# Patient Record
Sex: FEMALE | Race: WHITE | HISPANIC OR LATINO | Employment: UNEMPLOYED | URBAN - NONMETROPOLITAN AREA
[De-identification: names, ages, dates, MRNs, and addresses within clinical notes are randomized per-mention and may not be internally consistent; named-entity substitution may affect disease eponyms.]

---

## 2023-02-11 ENCOUNTER — HOSPITAL ENCOUNTER (EMERGENCY)
Facility: HOSPITAL | Age: 33
Discharge: HOME | End: 2023-02-12
Attending: EMERGENCY MEDICINE
Payer: MEDICAID

## 2023-02-11 DIAGNOSIS — K75.4 AUTOIMMUNE HEPATITIS: ICD-10-CM

## 2023-02-11 DIAGNOSIS — R42 LIGHTHEADEDNESS: ICD-10-CM

## 2023-02-11 DIAGNOSIS — E86.0 DEHYDRATION: ICD-10-CM

## 2023-02-11 DIAGNOSIS — R00.2 PALPITATIONS: Primary | ICD-10-CM

## 2023-02-11 DIAGNOSIS — R11.0 NAUSEA: ICD-10-CM

## 2023-02-11 DIAGNOSIS — N39.0 URINARY TRACT INFECTION WITHOUT HEMATURIA, SITE UNSPECIFIED: ICD-10-CM

## 2023-02-11 DIAGNOSIS — R42 ORTHOSTATIC DIZZINESS: ICD-10-CM

## 2023-02-11 DIAGNOSIS — K21.9 GASTROESOPHAGEAL REFLUX DISEASE, UNSPECIFIED WHETHER ESOPHAGITIS PRESENT: ICD-10-CM

## 2023-02-11 LAB
ALBUMIN SERPL BCP-MCNC: 4 G/DL (ref 3.5–5.2)
ALP SERPL-CCNC: 84 U/L (ref 55–135)
ALT SERPL W/O P-5'-P-CCNC: 24 U/L (ref 10–44)
ANION GAP SERPL CALC-SCNC: 11 MMOL/L (ref 8–16)
AST SERPL-CCNC: 19 U/L (ref 10–40)
BACTERIA #/AREA URNS HPF: ABNORMAL /HPF
BASOPHILS # BLD AUTO: 0.02 K/UL (ref 0–0.2)
BASOPHILS NFR BLD: 0.2 % (ref 0–1.9)
BILIRUB SERPL-MCNC: 0.2 MG/DL (ref 0.1–1)
BILIRUB UR QL STRIP: NEGATIVE
BUN SERPL-MCNC: 11 MG/DL (ref 6–20)
CALCIUM SERPL-MCNC: 9.3 MG/DL (ref 8.7–10.5)
CHLORIDE SERPL-SCNC: 106 MMOL/L (ref 95–110)
CLARITY UR: ABNORMAL
CO2 SERPL-SCNC: 23 MMOL/L (ref 23–29)
COLOR UR: YELLOW
CREAT SERPL-MCNC: 0.6 MG/DL (ref 0.5–1.4)
DIFFERENTIAL METHOD: ABNORMAL
EOSINOPHIL # BLD AUTO: 0 K/UL (ref 0–0.5)
EOSINOPHIL NFR BLD: 0 % (ref 0–8)
ERYTHROCYTE [DISTWIDTH] IN BLOOD BY AUTOMATED COUNT: 15.5 % (ref 11.5–14.5)
EST. GFR  (NO RACE VARIABLE): >60 ML/MIN/1.73 M^2
GLUCOSE SERPL-MCNC: 101 MG/DL (ref 70–110)
GLUCOSE UR QL STRIP: NEGATIVE
HCT VFR BLD AUTO: 34.5 % (ref 37–48.5)
HGB BLD-MCNC: 11.1 G/DL (ref 12–16)
HGB UR QL STRIP: ABNORMAL
HYALINE CASTS #/AREA URNS LPF: 3.5 /LPF
IMM GRANULOCYTES # BLD AUTO: 0.04 K/UL (ref 0–0.04)
IMM GRANULOCYTES NFR BLD AUTO: 0.4 % (ref 0–0.5)
KETONES UR QL STRIP: ABNORMAL
LEUKOCYTE ESTERASE UR QL STRIP: ABNORMAL
LYMPHOCYTES # BLD AUTO: 1.5 K/UL (ref 1–4.8)
LYMPHOCYTES NFR BLD: 15.7 % (ref 18–48)
MCH RBC QN AUTO: 25.3 PG (ref 27–31)
MCHC RBC AUTO-ENTMCNC: 32.2 G/DL (ref 32–36)
MCV RBC AUTO: 79 FL (ref 82–98)
MICROSCOPIC COMMENT: ABNORMAL
MONOCYTES # BLD AUTO: 0.5 K/UL (ref 0.3–1)
MONOCYTES NFR BLD: 5.4 % (ref 4–15)
NEUTROPHILS # BLD AUTO: 7.6 K/UL (ref 1.8–7.7)
NEUTROPHILS NFR BLD: 78.3 % (ref 38–73)
NITRITE UR QL STRIP: POSITIVE
NRBC BLD-RTO: 0 /100 WBC
PH UR STRIP: 7 [PH] (ref 5–8)
PLATELET # BLD AUTO: 229 K/UL (ref 150–450)
PMV BLD AUTO: 10.1 FL (ref 9.2–12.9)
POTASSIUM SERPL-SCNC: 3.5 MMOL/L (ref 3.5–5.1)
PROT SERPL-MCNC: 8 G/DL (ref 6–8.4)
PROT UR QL STRIP: NEGATIVE
RBC # BLD AUTO: 4.38 M/UL (ref 4–5.4)
RBC #/AREA URNS HPF: 2 /HPF (ref 0–4)
SODIUM SERPL-SCNC: 140 MMOL/L (ref 136–145)
SP GR UR STRIP: 1.01 (ref 1–1.03)
SQUAMOUS #/AREA URNS HPF: 4 /HPF
TROPONIN I SERPL DL<=0.01 NG/ML-MCNC: <4 PG/ML (ref 0–60)
URN SPEC COLLECT METH UR: ABNORMAL
UROBILINOGEN UR STRIP-ACNC: NEGATIVE EU/DL
WBC # BLD AUTO: 9.67 K/UL (ref 3.9–12.7)
WBC #/AREA URNS HPF: 29 /HPF (ref 0–5)

## 2023-02-11 PROCEDURE — 85025 COMPLETE CBC W/AUTO DIFF WBC: CPT | Performed by: EMERGENCY MEDICINE

## 2023-02-11 PROCEDURE — 96375 TX/PRO/DX INJ NEW DRUG ADDON: CPT | Mod: 59

## 2023-02-11 PROCEDURE — 63600175 PHARM REV CODE 636 W HCPCS: Performed by: EMERGENCY MEDICINE

## 2023-02-11 PROCEDURE — 93005 ELECTROCARDIOGRAM TRACING: CPT

## 2023-02-11 PROCEDURE — 96361 HYDRATE IV INFUSION ADD-ON: CPT

## 2023-02-11 PROCEDURE — 87077 CULTURE AEROBIC IDENTIFY: CPT | Performed by: EMERGENCY MEDICINE

## 2023-02-11 PROCEDURE — 36415 COLL VENOUS BLD VENIPUNCTURE: CPT | Performed by: EMERGENCY MEDICINE

## 2023-02-11 PROCEDURE — 85379 FIBRIN DEGRADATION QUANT: CPT | Performed by: EMERGENCY MEDICINE

## 2023-02-11 PROCEDURE — 93010 EKG 12-LEAD: ICD-10-PCS | Mod: ,,, | Performed by: INTERNAL MEDICINE

## 2023-02-11 PROCEDURE — 80053 COMPREHEN METABOLIC PANEL: CPT | Performed by: EMERGENCY MEDICINE

## 2023-02-11 PROCEDURE — 84484 ASSAY OF TROPONIN QUANT: CPT | Performed by: EMERGENCY MEDICINE

## 2023-02-11 PROCEDURE — 87088 URINE BACTERIA CULTURE: CPT | Performed by: EMERGENCY MEDICINE

## 2023-02-11 PROCEDURE — 87186 SC STD MICRODIL/AGAR DIL: CPT | Performed by: EMERGENCY MEDICINE

## 2023-02-11 PROCEDURE — 93010 ELECTROCARDIOGRAM REPORT: CPT | Mod: ,,, | Performed by: INTERNAL MEDICINE

## 2023-02-11 PROCEDURE — 99285 EMERGENCY DEPT VISIT HI MDM: CPT | Mod: 25

## 2023-02-11 PROCEDURE — 81000 URINALYSIS NONAUTO W/SCOPE: CPT | Performed by: EMERGENCY MEDICINE

## 2023-02-11 PROCEDURE — 25000003 PHARM REV CODE 250: Performed by: EMERGENCY MEDICINE

## 2023-02-11 PROCEDURE — 96372 THER/PROPH/DIAG INJ SC/IM: CPT | Mod: 59 | Performed by: EMERGENCY MEDICINE

## 2023-02-11 PROCEDURE — C9113 INJ PANTOPRAZOLE SODIUM, VIA: HCPCS | Performed by: EMERGENCY MEDICINE

## 2023-02-11 PROCEDURE — 87086 URINE CULTURE/COLONY COUNT: CPT | Performed by: EMERGENCY MEDICINE

## 2023-02-11 PROCEDURE — 96365 THER/PROPH/DIAG IV INF INIT: CPT

## 2023-02-11 PROCEDURE — 25500020 PHARM REV CODE 255: Performed by: EMERGENCY MEDICINE

## 2023-02-11 RX ORDER — FAMOTIDINE 20 MG/1
20 TABLET, FILM COATED ORAL 2 TIMES DAILY
Qty: 28 TABLET | Refills: 0 | Status: SHIPPED | OUTPATIENT
Start: 2023-02-11 | End: 2023-06-12 | Stop reason: ALTCHOICE

## 2023-02-11 RX ORDER — ALUMINUM HYDROXIDE, MAGNESIUM HYDROXIDE, AND SIMETHICONE 2400; 240; 2400 MG/30ML; MG/30ML; MG/30ML
15 SUSPENSION ORAL EVERY 6 HOURS PRN
Qty: 100 ML | Refills: 0 | Status: SHIPPED | OUTPATIENT
Start: 2023-02-11 | End: 2024-02-11

## 2023-02-11 RX ORDER — LIDOCAINE HYDROCHLORIDE 20 MG/ML
15 SOLUTION OROPHARYNGEAL ONCE
Status: COMPLETED | OUTPATIENT
Start: 2023-02-11 | End: 2023-02-11

## 2023-02-11 RX ORDER — ONDANSETRON 2 MG/ML
4 INJECTION INTRAMUSCULAR; INTRAVENOUS
Status: COMPLETED | OUTPATIENT
Start: 2023-02-11 | End: 2023-02-11

## 2023-02-11 RX ORDER — SUCRALFATE 1 G/1
1 TABLET ORAL
Qty: 28 TABLET | Refills: 0 | Status: SHIPPED | OUTPATIENT
Start: 2023-02-11 | End: 2023-02-18

## 2023-02-11 RX ORDER — DICYCLOMINE HYDROCHLORIDE 10 MG/ML
20 INJECTION INTRAMUSCULAR
Status: COMPLETED | OUTPATIENT
Start: 2023-02-11 | End: 2023-02-11

## 2023-02-11 RX ORDER — MAG HYDROX/ALUMINUM HYD/SIMETH 200-200-20
30 SUSPENSION, ORAL (FINAL DOSE FORM) ORAL ONCE
Status: COMPLETED | OUTPATIENT
Start: 2023-02-11 | End: 2023-02-11

## 2023-02-11 RX ORDER — PANTOPRAZOLE SODIUM 40 MG/10ML
40 INJECTION, POWDER, LYOPHILIZED, FOR SOLUTION INTRAVENOUS
Status: COMPLETED | OUTPATIENT
Start: 2023-02-11 | End: 2023-02-11

## 2023-02-11 RX ORDER — ONDANSETRON 4 MG/1
4-8 TABLET, ORALLY DISINTEGRATING ORAL EVERY 8 HOURS PRN
Qty: 20 TABLET | Refills: 0 | Status: SHIPPED | OUTPATIENT
Start: 2023-02-11

## 2023-02-11 RX ORDER — PANTOPRAZOLE SODIUM 20 MG/1
20 TABLET, DELAYED RELEASE ORAL
Qty: 28 TABLET | Refills: 0 | Status: SHIPPED | OUTPATIENT
Start: 2023-02-11 | End: 2023-06-12

## 2023-02-11 RX ORDER — HYDROXYZINE PAMOATE 100 MG/1
100 CAPSULE ORAL 4 TIMES DAILY PRN
Qty: 20 CAPSULE | Refills: 0 | Status: SHIPPED | OUTPATIENT
Start: 2023-02-11

## 2023-02-11 RX ADMIN — LIDOCAINE HYDROCHLORIDE 15 ML: 20 SOLUTION ORAL at 09:02

## 2023-02-11 RX ADMIN — PANTOPRAZOLE SODIUM 40 MG: 40 INJECTION, POWDER, FOR SOLUTION INTRAVENOUS at 11:02

## 2023-02-11 RX ADMIN — CEFTRIAXONE SODIUM 1 G: 1 INJECTION, POWDER, FOR SOLUTION INTRAMUSCULAR; INTRAVENOUS at 11:02

## 2023-02-11 RX ADMIN — ONDANSETRON HYDROCHLORIDE 4 MG: 2 SOLUTION INTRAMUSCULAR; INTRAVENOUS at 08:02

## 2023-02-11 RX ADMIN — DICYCLOMINE HYDROCHLORIDE 20 MG: 20 INJECTION INTRAMUSCULAR at 11:02

## 2023-02-11 RX ADMIN — IOHEXOL 100 ML: 350 INJECTION, SOLUTION INTRAVENOUS at 09:02

## 2023-02-11 RX ADMIN — SODIUM CHLORIDE 1000 ML: 9 INJECTION, SOLUTION INTRAVENOUS at 08:02

## 2023-02-11 RX ADMIN — ALUMINUM HYDROXIDE, MAGNESIUM HYDROXIDE, AND SIMETHICONE 30 ML: 200; 200; 20 SUSPENSION ORAL at 09:02

## 2023-02-12 VITALS
BODY MASS INDEX: 26.7 KG/M2 | WEIGHT: 136 LBS | DIASTOLIC BLOOD PRESSURE: 79 MMHG | SYSTOLIC BLOOD PRESSURE: 133 MMHG | HEART RATE: 74 BPM | RESPIRATION RATE: 18 BRPM | OXYGEN SATURATION: 100 % | HEIGHT: 60 IN | TEMPERATURE: 98 F

## 2023-02-12 LAB — D DIMER PPP IA.FEU-MCNC: 0.41 MG/L FEU

## 2023-02-12 RX ORDER — CEPHALEXIN 500 MG/1
500 CAPSULE ORAL 4 TIMES DAILY
Qty: 20 CAPSULE | Refills: 0 | Status: SHIPPED | OUTPATIENT
Start: 2023-02-12 | End: 2023-02-17

## 2023-02-13 NOTE — ED PROVIDER NOTES
Encounter Date: 2/11/2023       History     Chief Complaint   Patient presents with    Dizziness     Pt reports hands numb, dizziness, feeling like she is going to pass out, heart palpitations starting about an hour ago. Reports nausea. Mother gave her a blood pressure pill at home when she started feeling bad, but didn't take a pressure to see what it was.      33-year-old female comes in complaining of lightheadedness bilateral hand paresthesias and feeling like she is going to pass out.  She reports palpitations.  She says that all of the started about an hour ago.  She also reports nausea but no vomiting.  She said that she thought her blood pressure might be high so her mother gave her an unknown blood pressure medication.  No chest pain.  No shortness of breath.  No fever.  No abdominal pain or vomiting or diarrhea. Patient has a normal neurologic exam. No vision changes, confusion, speech difficulty, numbness, weakness. No bowel or bladder incontinence or retention. Ambulates without difficulty.  She also complains of heartburn.        Review of patient's allergies indicates:  No Known Allergies  Past Medical History:   Diagnosis Date    Autoimmune hepatitis     Elevated LFTs     Liver disease      Past Surgical History:   Procedure Laterality Date    LIVER BIOPSY  05/2016    TUBAL LIGATION       Family History   Problem Relation Age of Onset    Diabetes Mother     Hypertension Mother     Thyroid disease Mother     No Known Problems Father      Social History     Tobacco Use    Smoking status: Never    Smokeless tobacco: Never   Substance Use Topics    Alcohol use: Yes     Comment: 6 pk-twice weekly    Drug use: No     Review of Systems   Constitutional:  Negative for fever.   HENT:  Negative for sore throat.    Eyes:  Negative for visual disturbance.   Respiratory:  Negative for shortness of breath.    Cardiovascular:  Positive for palpitations. Negative for chest pain.   Gastrointestinal:  Positive for  nausea. Negative for diarrhea and vomiting.   Genitourinary:  Negative for dysuria.   Musculoskeletal:  Negative for back pain.   Skin:  Negative for rash.   Neurological:  Positive for light-headedness and numbness. Negative for syncope, facial asymmetry, speech difficulty and weakness.   Hematological:  Does not bruise/bleed easily.   Psychiatric/Behavioral:  The patient is nervous/anxious.    All other systems reviewed and are negative.    Physical Exam     Initial Vitals [02/11/23 1924]   BP Pulse Resp Temp SpO2   (!) 143/95 101 18 98.1 °F (36.7 °C) 100 %      MAP       --         Physical Exam    Nursing note and vitals reviewed.  Constitutional: She appears well-developed and well-nourished. She is not diaphoretic. No distress.   HENT:   Head: Normocephalic and atraumatic.   Eyes: EOM are normal. Pupils are equal, round, and reactive to light.   Neck: Neck supple.   Normal range of motion.  Cardiovascular:  Normal rate and regular rhythm.           Pulmonary/Chest: Breath sounds normal. She has no wheezes.   Abdominal: Abdomen is soft. Bowel sounds are normal. There is no abdominal tenderness.   Musculoskeletal:         General: No edema. Normal range of motion.      Cervical back: Normal range of motion and neck supple.     Neurological: She is alert and oriented to person, place, and time. She has normal strength. No cranial nerve deficit or sensory deficit.   Skin: Skin is warm and dry.   Psychiatric: She has a normal mood and affect. Thought content normal.       ED Course   Procedures  Labs Reviewed   CBC W/ AUTO DIFFERENTIAL - Abnormal; Notable for the following components:       Result Value    Hemoglobin 11.1 (*)     Hematocrit 34.5 (*)     MCV 79 (*)     MCH 25.3 (*)     RDW 15.5 (*)     Gran % 78.3 (*)     Lymph % 15.7 (*)     All other components within normal limits   URINALYSIS, REFLEX TO URINE CULTURE - Abnormal; Notable for the following components:    Appearance, UA Cloudy (*)     Ketones, UA  1+ (*)     Occult Blood UA 1+ (*)     Nitrite, UA Positive (*)     Leukocytes, UA Trace (*)     All other components within normal limits    Narrative:     Preferred Collection Type->Urine, Clean Catch  Specimen Source->Urine   URINALYSIS MICROSCOPIC - Abnormal; Notable for the following components:    WBC, UA 29 (*)     Bacteria Moderate (*)     Hyaline Casts, UA 3.5 (*)     All other components within normal limits    Narrative:     Preferred Collection Type->Urine, Clean Catch  Specimen Source->Urine   CULTURE, URINE   COMPREHENSIVE METABOLIC PANEL   D DIMER, QUANTITATIVE   TROPONIN I HIGH SENSITIVITY     EKG Readings: (Independently Interpreted)   Initial Reading: No STEMI. Rhythm: Sinus Arrhythmia.   ECG Results              EKG 12-lead (Final result)  Result time 02/12/23 08:39:50      Final result by Interface, [facility] (02/12/23 08:39:50)                   Narrative:    Test Reason : R00.2,    Vent. Rate : 085 BPM     Atrial Rate : 085 BPM     P-R Int : 144 ms          QRS Dur : 080 ms      QT Int : 372 ms       P-R-T Axes : 069 060 081 degrees     QTc Int : 442 ms    Normal sinus rhythm with sinus arrhythmia  Nonspecific T wave abnormality  Otherwise normal ECG    No previous ECGs available  Confirmed by Neri Falk MD ([id number]) on 2/12/2023 8:39:42 AM    Referred By: [email or web address]   SELF           Confirmed By:Neri Falk MD                                  Imaging Results              CTA Chest Non-Coronary (PE Studies) (Final result)  Result time 02/12/23 10:05:52      Final result by Ann-Marie Nelson MD (02/12/23 10:05:52)                   Impression:      No evidence of pulmonary emboli    Small hiatal hernia    Preliminary report provided by direct Radiology      Electronically signed by: Ann-Marie Nelson MD  Date:    02/12/2023  Time:    10:05               Narrative:    EXAMINATION:  CTA CHEST NON CORONARY (PE STUDIES)    CLINICAL HISTORY:  Pulmonary embolism (PE) suspected, positive  D-dimer;    CT/Cardiac Nuclear exams in prior 12 months: 0    TECHNIQUE:  Pulmonary CT angiogram with IV contrast.  Coronal MIP reconstructions prepared.  Iterative reconstruction utilized.    FINDINGS:  No evidence of pulmonary emboli, aortic aneurysm or dissection.  Clear lungs.  No pneumothorax, pleural effusion or pericardial effusion.  Small hiatal hernia.                                    X-Rays:   Independently Interpreted Readings:   Chest X-Ray: No infiltrates.  No acute abnormalities.   Medications   sodium chloride 0.9% bolus 1,000 mL 1,000 mL (0 mLs Intravenous Stopped 2/11/23 2100)   ondansetron injection 4 mg (4 mg Intravenous Given 2/11/23 2012)   aluminum-magnesium hydroxide-simethicone 200-200-20 mg/5 mL suspension 30 mL (30 mLs Oral Given 2/11/23 2112)     And   LIDOcaine HCl 2% oral solution 15 mL (15 mLs Oral Given 2/11/23 2111)   cefTRIAXone (ROCEPHIN) 1 g in dextrose 5 % in water (D5W) 5 % 50 mL IVPB (MB+) (0 g Intravenous Stopped 2/11/23 2343)   iohexoL (OMNIPAQUE 350) injection 100 mL (100 mLs Intravenous Given 2/11/23 2132)   pantoprazole injection 40 mg (40 mg Intravenous Given 2/11/23 2354)   dicyclomine injection 20 mg (20 mg Intramuscular Given 2/11/23 2359)                Attending Attestation:             Attending ED Notes:   33-year-old female comes in complaining of lightheadedness bilateral hand paresthesias and feeling like she is going to pass out.  She reports palpitations.  She says that all of the started about an hour ago.  She also reports nausea but no vomiting.  She said that she thought her blood pressure might be high so her mother gave her an unknown blood pressure medication.  No chest pain.  No shortness of breath.  No fever.  No abdominal pain or vomiting or diarrhea. Patient has a normal neurologic exam. No vision changes, confusion, speech difficulty, numbness, weakness. No bowel or bladder incontinence or retention. Ambulates without difficulty. She also  complains of heartburn.    Final diagnoses:  [R00.2] Palpitations (Primary)  [E86.0] Dehydration  [R42] Lightheadedness  [R11.0] Nausea  [N39.0] Urinary tract infection without hematuria, site unspecified  [K21.9] Gastroesophageal reflux disease, unspecified whether esophagitis present  [K75.4] Autoimmune hepatitis  [R42] Orthostatic dizziness    CBC CMP troponin D-dimer urinalysis EKG chest x-ray CTA revealed no other acute pathology.    Medications  IV fluids, Zofran, GI cocktail, Rocephin, Protonix, Bentyl.  Patient feels much better after medications administered in the emergency department.  Prescriptions given for UTI, anxiety, GERD, nausea. Patient is non-toxic appearing, in no acute distress, and vital signs are stable and normal upon discharge. Upon completion of ED evaluation and management, with consideration of thorough differential diagnosis, the patient was found to have no acutely abnormal physical exam findings or other pathology requiring further emergent intervention or admission at this time. Patient/caregiver has no complaints upon discharge and verbalizes understanding and agreement with diagnosis and treatment plan. Discharge instructions with return precautions provided. Patient/caregiver verbalizes understanding to return to ED immediately for any new or worsening symptoms and to follow up with PCP/specialist recommended in 1-2 days.                          Clinical Impression:   Final diagnoses:  [R00.2] Palpitations (Primary)  [E86.0] Dehydration  [R42] Lightheadedness  [R11.0] Nausea  [N39.0] Urinary tract infection without hematuria, site unspecified  [K21.9] Gastroesophageal reflux disease, unspecified whether esophagitis present  [K75.4] Autoimmune hepatitis  [R42] Orthostatic dizziness        ED Disposition Condition    Discharge Stable          ED Prescriptions       Medication Sig Dispense Start Date End Date Auth. Provider    ondansetron (ZOFRAN-ODT) 4 MG TbDL Take 1-2 tablets (4-8  mg total) by mouth every 8 (eight) hours as needed (Nausea/Vomiting). 20 tablet 2/11/2023 -- Mary Ann Herrera MD    hydrOXYzine (VISTARIL) 100 MG capsule Take 1 capsule (100 mg total) by mouth 4 (four) times daily as needed for Anxiety. 20 capsule 2/11/2023 -- Mary Ann Herrera MD    pantoprazole (PROTONIX) 20 MG tablet Take 1 tablet (20 mg total) by mouth 2 (two) times daily before meals. for 14 days 28 tablet 2/11/2023 2/25/2023 Mary Ann Herrera MD    famotidine (PEPCID) 20 MG tablet Take 1 tablet (20 mg total) by mouth 2 (two) times daily. for 14 days 28 tablet 2/11/2023 2/25/2023 Mary Ann Herrera MD    sucralfate (CARAFATE) 1 gram tablet Take 1 tablet (1 g total) by mouth 4 (four) times daily before meals and nightly. for 7 days 28 tablet 2/11/2023 2/18/2023 Mary Ann Herrera MD    aluminum & magnesium hydroxide-simethicone (MYLANTA MAXIMUM STRENGTH) 400-400-40 mg/5 mL suspension Take 15 mLs by mouth every 6 (six) hours as needed for Indigestion. 100 mL 2/11/2023 2/11/2024 Mary Ann Herrera MD    cephALEXin (KEFLEX) 500 MG capsule Take 1 capsule (500 mg total) by mouth 4 (four) times daily. for 5 days 20 capsule 2/12/2023 2/17/2023 Mary Ann Herrera MD          Follow-up Information       Follow up With Specialties Details Why Contact Info Additional Information    [facility] Psychology, Internal Medicine, Gynecology, Dental General Practice Schedule an appointment as soon as possible for a visit   [address] [place] [id number]  [phone]       [place] - Emergency Department Emergency Medicine Go to  As needed, If symptoms worsen [address] [facility], [place] [id number] [phone] Floor 1             Mary Ann Herrera MD  02/13/23 0531

## 2023-02-15 LAB — BACTERIA UR CULT: ABNORMAL

## 2024-09-25 ENCOUNTER — HOSPITAL ENCOUNTER (EMERGENCY)
Facility: HOSPITAL | Age: 34
Discharge: HOME | End: 2024-09-25
Attending: FAMILY MEDICINE
Payer: MEDICAID

## 2024-09-25 VITALS
BODY MASS INDEX: 26.81 KG/M2 | TEMPERATURE: 98 F | HEART RATE: 71 BPM | OXYGEN SATURATION: 99 % | HEIGHT: 61 IN | DIASTOLIC BLOOD PRESSURE: 86 MMHG | WEIGHT: 142 LBS | SYSTOLIC BLOOD PRESSURE: 134 MMHG | RESPIRATION RATE: 20 BRPM

## 2024-09-25 DIAGNOSIS — N76.4 LABIAL ABSCESS: Primary | ICD-10-CM

## 2024-09-25 PROCEDURE — 99283 EMERGENCY DEPT VISIT LOW MDM: CPT | Mod: 25

## 2024-09-25 PROCEDURE — 56405 I&D VULVA/PERINEAL ABSCESS: CPT

## 2024-09-25 PROCEDURE — 25000003 PHARM REV CODE 250: Performed by: FAMILY MEDICINE

## 2024-09-25 RX ORDER — DOXYCYCLINE 100 MG/1
100 CAPSULE ORAL 2 TIMES DAILY
Qty: 20 CAPSULE | Refills: 0 | Status: SHIPPED | OUTPATIENT
Start: 2024-09-25 | End: 2024-10-05

## 2024-09-25 RX ORDER — DOXYCYCLINE HYCLATE 100 MG
100 TABLET ORAL
Status: COMPLETED | OUTPATIENT
Start: 2024-09-25 | End: 2024-09-25

## 2024-09-25 RX ORDER — LIDOCAINE HYDROCHLORIDE 10 MG/ML
10 INJECTION, SOLUTION INFILTRATION; PERINEURAL
Status: COMPLETED | OUTPATIENT
Start: 2024-09-25 | End: 2024-09-25

## 2024-09-25 RX ADMIN — DOXYCYCLINE HYCLATE 100 MG: 100 TABLET, COATED ORAL at 11:09

## 2024-09-25 RX ADMIN — LIDOCAINE HYDROCHLORIDE 10 ML: 10 INJECTION, SOLUTION INFILTRATION; PERINEURAL at 11:09

## 2024-09-25 NOTE — Clinical Note
"Anisa Schaffercain Anders was seen and treated in our emergency department on 9/25/2024.  She may return to work on 09/27/2024.       If you have any questions or concerns, please don't hesitate to call.      Jay Dumont Jr., MD"

## 2024-09-26 NOTE — ED PROVIDER NOTES
Encounter Date: 9/25/2024       History     Chief Complaint   Patient presents with    vaginal abcess     Pt reports with a vaginal bump since Sunday that has only grown bigger in size.        Abscess   This is a new problem. The current episode started two days ago. The problem has been gradually worsening. Affected Location: right labia. The pain is at a severity of 5/10. The abscess is characterized by itchiness, redness and painfulness. There were no sick contacts. She has received no recent medical care.     Review of patient's allergies indicates:  No Known Allergies  Past Medical History:   Diagnosis Date    Autoimmune hepatitis     Elevated LFTs     GERD (gastroesophageal reflux disease)     Liver disease      Past Surgical History:   Procedure Laterality Date    LIVER BIOPSY  05/2016    TUBAL LIGATION       Family History   Problem Relation Name Age of Onset    Diabetes Mother      Hypertension Mother      Thyroid disease Mother      No Known Problems Father       Social History     Tobacco Use    Smoking status: Never    Smokeless tobacco: Never   Substance Use Topics    Alcohol use: Yes     Comment: 6 pk-twice weekly    Drug use: No     Review of Systems   Skin:         abscess   All other systems reviewed and are negative.      Physical Exam     Initial Vitals [09/25/24 2232]   BP Pulse Resp Temp SpO2   134/86 71 20 98.4 °F (36.9 °C) 99 %      MAP       --         Physical Exam    Nursing note and vitals reviewed.  Constitutional: Vital signs are normal. She appears well-developed and well-nourished. She is not diaphoretic.  Non-toxic appearance. She does not have a sickly appearance.   HENT:   Head: Normocephalic and atraumatic.   Right Ear: Hearing, tympanic membrane, external ear and ear canal normal.   Left Ear: Tympanic membrane, external ear and ear canal normal.   Mouth/Throat: Uvula is midline, oropharynx is clear and moist and mucous membranes are normal.   Eyes: Conjunctivae, EOM and lids are  normal. Pupils are equal, round, and reactive to light. Lids are everted and swept, no foreign bodies found.   Neck: Trachea normal and phonation normal. Neck supple.   Normal range of motion.   Full passive range of motion without pain.     Cardiovascular:  Normal rate, regular rhythm, normal heart sounds, intact distal pulses and normal pulses.           Pulmonary/Chest: Breath sounds normal.   Abdominal: Abdomen is soft. Bowel sounds are normal. There is no abdominal tenderness.   Genitourinary:    Genitourinary Comments: 1 cm erythematous pointing abscess with mild surrounding erythema         Musculoskeletal:      Cervical back: Normal, full passive range of motion without pain, normal range of motion and neck supple.      Thoracic back: Normal.      Lumbar back: Normal.     Neurological: She is alert and oriented to person, place, and time. She has normal strength. No cranial nerve deficit or sensory deficit.   Skin: Skin is intact.   Psychiatric: She has a normal mood and affect. Her speech is normal and behavior is normal.         ED Course   I & D - Incision and Drainage    Date/Time: 9/25/2024 11:14 PM  Location procedure was performed: [facility] EMERGENCY DEPARTMENT    Performed by: Jay Dumont Jr., MD  Authorized by: Jay Dumont Jr., MD  Pre-operative diagnosis: Abscess  Post-operative diagnosis: Abscess  Type: abscess  Body area: anogenital  Location details: vulva  Anesthesia: local infiltration    Anesthesia:  Local Anesthetic: lidocaine 1% without epinephrine  Anesthetic total: 10 mL    Patient sedated: no  Scalpel size: 11  Incision type: single straight  Incision depth: dermal  Complexity: simple  Drainage: pus and bloody  Drainage amount: moderate  Wound treatment: incision, drainage and expression of material  Packing material: none  Complications: No  Specimens: No  Implants: No  Patient tolerance: Patient tolerated the procedure well with no immediate complications    Incision depth:  dermal        Labs Reviewed - No data to display       Imaging Results    None          Medications   doxycycline tablet 100 mg (100 mg Oral Given 9/25/24 2313)   LIDOcaine HCL 10 mg/ml (1%) injection 10 mL (10 mLs Other Given 9/25/24 2313)     Medical Decision Making  Risk  Prescription drug management.                          Medical Decision Making:   Differential Diagnosis:   Abscess, cyst, cellulitis, insect bite  ED Management:  Discussed with patient risks and benefits of medication including side effect.  Discussed with patient need to follow-up with gynecologist for repeat exam evaluation.  Discussed with patient cleaning and hygiene.  Patient and friend report they understand plan of care and right foot discharged home             Clinical Impression:  Final diagnoses:  [N76.4] Labial abscess (Primary)          ED Disposition Condition    Discharge Stable          ED Prescriptions       Medication Sig Dispense Start Date End Date Auth. Provider    doxycycline (VIBRAMYCIN) 100 MG Cap Take 1 capsule (100 mg total) by mouth 2 (two) times daily. for 10 days 20 capsule 9/25/2024 10/5/2024 Jay Dumont Jr., MD          Follow-up Information       Follow up With Specialties Details Why Contact Info    pcp  In 2 days As needed, If symptoms worsen, For wound re-check              Jay Dumont Jr., MD  09/25/24 5338

## 2024-11-20 ENCOUNTER — HOSPITAL ENCOUNTER (EMERGENCY)
Facility: HOSPITAL | Age: 34
Discharge: HOME | End: 2024-11-20
Attending: EMERGENCY MEDICINE

## 2024-11-20 VITALS
SYSTOLIC BLOOD PRESSURE: 109 MMHG | RESPIRATION RATE: 18 BRPM | OXYGEN SATURATION: 98 % | TEMPERATURE: 99 F | HEIGHT: 61 IN | DIASTOLIC BLOOD PRESSURE: 77 MMHG | BODY MASS INDEX: 26.06 KG/M2 | WEIGHT: 138 LBS | HEART RATE: 110 BPM

## 2024-11-20 DIAGNOSIS — S63.652A SPRAIN OF METACARPOPHALANGEAL (MCP) JOINT OF RIGHT MIDDLE FINGER, INITIAL ENCOUNTER: Primary | ICD-10-CM

## 2024-11-20 PROCEDURE — 99283 EMERGENCY DEPT VISIT LOW MDM: CPT

## 2024-11-20 RX ORDER — NAPROXEN 500 MG/1
500 TABLET ORAL EVERY 12 HOURS PRN
Qty: 20 TABLET | Refills: 0 | Status: SHIPPED | OUTPATIENT
Start: 2024-11-20

## 2024-11-21 NOTE — ED PROVIDER NOTES
"EMERGENCY DEPARTMENT HISTORY AND PHYSICAL EXAM     This note is dictated on M*[employer] word recognition program.  There are word recognition mistakes and grammatical errors that are occasionally missed on review.     Date: 11/20/2024   Patient Name: Anisa Anders       History of Presenting Illness      Chief Complaint   Patient presents with    Hand Pain     Pt to ED complaining of pain to R middle finger after "twisting it while lifting something" 1 week ago. No meds taken for pain relief.  No obvious swelling or deformities.           Anisa Anders is a 34 y.o. female with PMHX of denies significant history who presents to the emergency department C/O right middle finger pain.    Patient says about a week ago she was lifting something and twisted.  Complains of pain of her right 3rd MCP for the past week.  Has not tried anything for this.  Patient right-hand dominant      PCP: No, Primary Doctor        No current facility-administered medications for this encounter.     Current Outpatient Medications   Medication Sig Dispense Refill    aluminum & magnesium hydroxide-simethicone (MYLANTA MAXIMUM STRENGTH) 400-400-40 mg/5 mL suspension Take 15 mLs by mouth every 6 (six) hours as needed for Indigestion. 100 mL 0    azaTHIOprine (IMURAN) 50 mg Tab Take 1 tablet (50 mg total) by mouth once daily. 90 tablet 4    ferrous sulfate 325 (65 FE) MG EC tablet Take by mouth.      hydrOXYzine (VISTARIL) 100 MG capsule Take 1 capsule (100 mg total) by mouth 4 (four) times daily as needed for Anxiety. (Patient not taking: Reported on 6/12/2023) 20 capsule 0    naproxen (NAPROSYN) 500 MG tablet Take 1 tablet (500 mg total) by mouth every 12 (twelve) hours as needed (Pain). 20 tablet 0    ondansetron (ZOFRAN-ODT) 4 MG TbDL Take 1-2 tablets (4-8 mg total) by mouth every 8 (eight) hours as needed (Nausea/Vomiting). 20 tablet 0    pantoprazole (PROTONIX) 20 MG tablet Take 1 tablet by mouth once daily 30 tablet 11           Past " "History     Past Medical History:   Past Medical History:   Diagnosis Date    Autoimmune hepatitis     Elevated LFTs     GERD (gastroesophageal reflux disease)     Liver disease         Past Surgical History:   Past Surgical History:   Procedure Laterality Date    LIVER BIOPSY  05/2016    TUBAL LIGATION          Family History:   Family History   Problem Relation Name Age of Onset    Diabetes Mother      Hypertension Mother      Thyroid disease Mother      No Known Problems Father          Social History:   Social History     Tobacco Use    Smoking status: Some Days     Current packs/day: 0.50     Types: Cigarettes    Smokeless tobacco: Never   Substance Use Topics    Alcohol use: Yes     Comment: 6 pk-twice weekly    Drug use: No        Allergies:   Review of patient's allergies indicates:  No Known Allergies       Review of Systems   Review of Systems   See HPI for pertinent positives and negatives       Physical Exam     Vitals:    11/20/24 2243   BP: 109/77   Pulse: 110   Resp: 18   Temp: 99.2 °F (37.3 °C)   SpO2: 98%   Weight: 62.6 kg (138 lb)   Height: 5' 1" (1.549 m)      Physical Exam  Vitals and nursing note reviewed.   Constitutional:       General: She is not in acute distress.     Appearance: Normal appearance. She is not ill-appearing.   HENT:      Head: Normocephalic and atraumatic.      Right Ear: External ear normal.      Left Ear: External ear normal.      Nose: Nose normal. No congestion or rhinorrhea.      Mouth/Throat:      Mouth: Mucous membranes are moist.   Eyes:      Conjunctiva/sclera: Conjunctivae normal.      Pupils: Pupils are equal, round, and reactive to light.   Pulmonary:      Effort: Pulmonary effort is normal. No respiratory distress.   Musculoskeletal:         General: No deformity. Normal range of motion.      Right hand: Tenderness present. No bony tenderness. Normal range of motion.      Cervical back: Normal range of motion. No rigidity.      Comments: Mild swelling over right " 3rd MCP, full range of motion of digits   Skin:     General: Skin is dry.   Neurological:      General: No focal deficit present.      Mental Status: She is alert and oriented to person, place, and time. Mental status is at baseline.   Psychiatric:         Mood and Affect: Mood normal.         Behavior: Behavior normal.              Diagnostic Study Results      Labs -   No results found for this or any previous visit (from the past 12 hours).     Radiologic Studies -    No orders to display        Medications given in the ED-   Medications - No data to display        Medical Decision Making    I am the first provider for this patient.     I reviewed the vital signs, available nursing notes, past medical history, past surgical history, family history and social history.     Vital Signs:  Reviewed the patient's vital signs.     Pulse Oximetry Analysis and Interpretation:    98% on Room Air, normal        External Test Results (Pertinent to encounter):    Records Reviewed: Nursing Notes    History Obtained By: Patient    Provider Notes: Anisa Anders is a 34 y.o. female with right hand pain    Co-morbidities Considered:     Differential Diagnosis:       ED Course:    Patient presents with symptoms of history consistent with a finger sprain, will treat anti-inflammatories, temporary immobilization, care instructions discussed.         Problems Addressed:  Finger sprain    Procedures:   Procedures       Diagnosis and Disposition     Critical Care:      DISCHARGE NOTE:       Anisa Anders's  results have been reviewed with her.  She has been counseled regarding her diagnosis, treatment, and plan.  She verbally conveys understanding and agreement of the signs, symptoms, diagnosis, treatment and prognosis and additionally agrees to follow up as discussed.  She also agrees with the care-plan and conveys that all of her questions have been answered.  I have also provided discharge instructions for her that include:  educational information regarding their diagnosis and treatment, and list of reasons why they would want to return to the ED prior to their follow-up appointment, should her condition change. She has been provided with education for proper emergency department utilization.         CLINICAL IMPRESSION:         1. Sprain of metacarpophalangeal (MCP) joint of right middle finger, initial encounter              PLAN:   1. Discharge Home  2.      Medication List        START taking these medications      naproxen 500 MG tablet  Commonly known as: NAPROSYN  Take 1 tablet (500 mg total) by mouth every 12 (twelve) hours as needed (Pain).            ASK your doctor about these medications      aluminum & magnesium hydroxide-simethicone 400-400-40 mg/5 mL suspension  Commonly known as: MYLANTA MAXIMUM STRENGTH  Take 15 mLs by mouth every 6 (six) hours as needed for Indigestion.     azaTHIOprine 50 mg Tab  Commonly known as: IMURAN  Take 1 tablet (50 mg total) by mouth once daily.     ferrous sulfate 325 (65 FE) MG EC tablet     hydrOXYzine 100 MG capsule  Commonly known as: VISTARIL  Take 1 capsule (100 mg total) by mouth 4 (four) times daily as needed for Anxiety.     ondansetron 4 MG Tbdl  Commonly known as: ZOFRAN-ODT  Take 1-2 tablets (4-8 mg total) by mouth every 8 (eight) hours as needed (Nausea/Vomiting).     pantoprazole 20 MG tablet  Commonly known as: PROTONIX  Take 1 tablet by mouth once daily               Where to Get Your Medications        These medications were sent to [facility] [address] [place] [id number]      Phone: [phone]   naproxen 500 MG tablet        3. Your PCP    Schedule an appointment as soon as possible for a visit   Primary care follow up       _______________________________     Please note that this dictation was completed with [employer], the computer voice recognition software.  Quite often unanticipated grammatical, syntax,  homophones, and other interpretive errors are inadvertently transcribed by the computer software.  Please disregard these errors.  Please excuse any errors that have escaped final proofreading.             Chandu Jaimes MD  11/20/24 2648